# Patient Record
Sex: MALE | Race: BLACK OR AFRICAN AMERICAN | NOT HISPANIC OR LATINO | URBAN - METROPOLITAN AREA
[De-identification: names, ages, dates, MRNs, and addresses within clinical notes are randomized per-mention and may not be internally consistent; named-entity substitution may affect disease eponyms.]

---

## 2018-04-26 ENCOUNTER — EMERGENCY (EMERGENCY)
Facility: HOSPITAL | Age: 58
LOS: 1 days | Discharge: HOME | End: 2018-04-26
Attending: EMERGENCY MEDICINE | Admitting: EMERGENCY MEDICINE
Payer: SELF-PAY

## 2018-04-26 VITALS
SYSTOLIC BLOOD PRESSURE: 127 MMHG | DIASTOLIC BLOOD PRESSURE: 80 MMHG | TEMPERATURE: 99 F | RESPIRATION RATE: 18 BRPM | HEART RATE: 112 BPM | OXYGEN SATURATION: 100 %

## 2018-04-26 VITALS — WEIGHT: 134.92 LBS | HEIGHT: 65 IN

## 2018-04-26 PROCEDURE — 99285 EMERGENCY DEPT VISIT HI MDM: CPT | Mod: 25

## 2018-04-26 PROCEDURE — 26770 TREAT FINGER DISLOCATION: CPT | Mod: 54

## 2018-04-26 PROCEDURE — 73130 X-RAY EXAM OF HAND: CPT | Mod: 26,76,LT

## 2018-04-26 PROCEDURE — 73130 X-RAY EXAM OF HAND: CPT

## 2018-04-26 PROCEDURE — 26770 TREAT FINGER DISLOCATION: CPT | Mod: F4

## 2018-04-26 PROCEDURE — 99284 EMERGENCY DEPT VISIT MOD MDM: CPT | Mod: 25

## 2018-04-26 RX ORDER — IBUPROFEN 200 MG
600 TABLET ORAL ONCE
Qty: 0 | Refills: 0 | Status: COMPLETED | OUTPATIENT
Start: 2018-04-26 | End: 2018-04-26

## 2018-04-26 RX ADMIN — Medication 600 MILLIGRAM(S): at 20:09

## 2018-04-26 RX ADMIN — Medication 600 MILLIGRAM(S): at 20:15

## 2018-04-26 NOTE — ED PROVIDER NOTE - MEDICAL DECISION MAKING DETAILS
X-rays results reviewed X-rays results reviewed, reduction of left 5th digit performed, splinted patient, hand referral, pt explained results and d/c instructions, pt verbalizes understanding results and d/c instructions

## 2018-04-26 NOTE — ED PROVIDER NOTE - PHYSICAL EXAMINATION
General: Well appearing, sitting comfortably Cardio: S1/S2, no murmurs Resp: Clear to auscultation bilaterally, no wheezes, rales or rhonchi MSK: Tenderness over left 5th digit, decreased ROM due to pain, neurovascularly intact, radial pulse 2+ Skin: Deformity of left 5th digit noted, no erythema or swelling noted

## 2018-04-26 NOTE — ED PROVIDER NOTE - ATTENDING CONTRIBUTION TO CARE
SEEN W PA . DISLOCATED FIFTH DIGIT LEFT. DECREASED ROM. NL SENSATION. XRAY SIG DISLOCATION. BLOCKED AND REDUCED BY PA. SPLINTED BY PA. REPEAT FILM IN PLACE. HAND F/U  AGREE WITH HX PE TX DISPO

## 2018-04-26 NOTE — ED PROVIDER NOTE - CARE PLAN
Principal Discharge DX:	Finger dislocation Principal Discharge DX:	Finger dislocation  Secondary Diagnosis:	Finger fracture
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
## 2018-07-03 ENCOUNTER — EMERGENCY (EMERGENCY)
Facility: HOSPITAL | Age: 58
LOS: 1 days | Discharge: HOME | End: 2018-07-03
Attending: EMERGENCY MEDICINE
Payer: COMMERCIAL

## 2018-07-03 VITALS
OXYGEN SATURATION: 98 % | SYSTOLIC BLOOD PRESSURE: 154 MMHG | RESPIRATION RATE: 18 BRPM | DIASTOLIC BLOOD PRESSURE: 90 MMHG | TEMPERATURE: 98 F | HEART RATE: 70 BPM

## 2018-07-03 VITALS
DIASTOLIC BLOOD PRESSURE: 103 MMHG | HEART RATE: 72 BPM | WEIGHT: 139.99 LBS | TEMPERATURE: 98 F | HEIGHT: 65 IN | RESPIRATION RATE: 18 BRPM | OXYGEN SATURATION: 99 % | SYSTOLIC BLOOD PRESSURE: 165 MMHG

## 2018-07-03 PROCEDURE — 99284 EMERGENCY DEPT VISIT MOD MDM: CPT

## 2018-07-03 PROCEDURE — 73130 X-RAY EXAM OF HAND: CPT | Mod: 26,LT

## 2018-07-03 NOTE — ED PROVIDER NOTE - PROGRESS NOTE DETAILS
Pt. with swelling to DIP. Pt. able to flex distal phalanx. Possible dislocation of Left distal phalanx.  Finger splint place after reduction attempt. Pt. most likely with finger sprain after pt. was re-examined. Finger splint still placed on patient. Pt. will be discharged home.
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
## 2018-07-03 NOTE — ED PROVIDER NOTE - PHYSICAL EXAMINATION
General: thin, ill appearing male in NAD resting comfortably on stretcher  HEENT: normocephalic, atraumatic, without lacerations or bruises, no discharge from nasal cavities or ears, nonicteric sclera, neck supple with full ROM   CV: normal S1-S2, no m/r/g  Pulm: clear to auscultation bilaterally without wheeze  Abd: nontender, nondistended, thin, no HSM, no masses   Msk: lumbar tenderness to palpation  Neuro: 5/5 strength bilaterally, sensation intact bilaterally, antalgic gait   Skin: L elbow abrasion with dried blood, wound clean   Psych: no active depression or anxiety

## 2018-07-03 NOTE — ED PROVIDER NOTE - ENMT NEGATIVE STATEMENT, MLM
Ears: no tinnitus. Nose: no nasal drainage..Neck: no lumps, no pain, no stiffness and no swollen glands.
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
## 2018-07-08 PROCEDURE — 73130 X-RAY EXAM OF HAND: CPT

## 2018-07-08 PROCEDURE — 99283 EMERGENCY DEPT VISIT LOW MDM: CPT

## 2020-09-08 ENCOUNTER — EMERGENCY (EMERGENCY)
Facility: HOSPITAL | Age: 60
LOS: 1 days | Discharge: HOME | End: 2020-09-08
Attending: EMERGENCY MEDICINE
Payer: MEDICAID

## 2020-09-08 VITALS
TEMPERATURE: 99 F | SYSTOLIC BLOOD PRESSURE: 182 MMHG | HEART RATE: 82 BPM | RESPIRATION RATE: 20 BRPM | HEIGHT: 65 IN | WEIGHT: 145.06 LBS | OXYGEN SATURATION: 99 % | DIASTOLIC BLOOD PRESSURE: 134 MMHG

## 2020-09-08 VITALS
TEMPERATURE: 99 F | OXYGEN SATURATION: 100 % | DIASTOLIC BLOOD PRESSURE: 110 MMHG | HEART RATE: 73 BPM | SYSTOLIC BLOOD PRESSURE: 164 MMHG | RESPIRATION RATE: 18 BRPM

## 2020-09-08 LAB
ALBUMIN SERPL ELPH-MCNC: 4.2 G/DL — SIGNIFICANT CHANGE UP (ref 3.3–5.2)
ALP SERPL-CCNC: 64 U/L — SIGNIFICANT CHANGE UP (ref 40–120)
ALT FLD-CCNC: 57 U/L — HIGH
ANION GAP SERPL CALC-SCNC: 15 MMOL/L — SIGNIFICANT CHANGE UP (ref 5–17)
AST SERPL-CCNC: 109 U/L — HIGH
BASOPHILS # BLD AUTO: 0.02 K/UL — SIGNIFICANT CHANGE UP (ref 0–0.2)
BASOPHILS NFR BLD AUTO: 0.4 % — SIGNIFICANT CHANGE UP (ref 0–2)
BILIRUB SERPL-MCNC: 0.6 MG/DL — SIGNIFICANT CHANGE UP (ref 0.4–2)
BUN SERPL-MCNC: 11 MG/DL — SIGNIFICANT CHANGE UP (ref 8–20)
CALCIUM SERPL-MCNC: 9.7 MG/DL — SIGNIFICANT CHANGE UP (ref 8.6–10.2)
CHLORIDE SERPL-SCNC: 99 MMOL/L — SIGNIFICANT CHANGE UP (ref 98–107)
CO2 SERPL-SCNC: 23 MMOL/L — SIGNIFICANT CHANGE UP (ref 22–29)
CREAT SERPL-MCNC: 0.97 MG/DL — SIGNIFICANT CHANGE UP (ref 0.5–1.3)
EOSINOPHIL # BLD AUTO: 0.03 K/UL — SIGNIFICANT CHANGE UP (ref 0–0.5)
EOSINOPHIL NFR BLD AUTO: 0.5 % — SIGNIFICANT CHANGE UP (ref 0–6)
GLUCOSE SERPL-MCNC: 95 MG/DL — SIGNIFICANT CHANGE UP (ref 70–99)
HCT VFR BLD CALC: 47 % — SIGNIFICANT CHANGE UP (ref 39–50)
HGB BLD-MCNC: 15.5 G/DL — SIGNIFICANT CHANGE UP (ref 13–17)
IMM GRANULOCYTES NFR BLD AUTO: 0.2 % — SIGNIFICANT CHANGE UP (ref 0–1.5)
LYMPHOCYTES # BLD AUTO: 2.79 K/UL — SIGNIFICANT CHANGE UP (ref 1–3.3)
LYMPHOCYTES # BLD AUTO: 50.8 % — HIGH (ref 13–44)
MAGNESIUM SERPL-MCNC: 2.3 MG/DL — SIGNIFICANT CHANGE UP (ref 1.8–2.6)
MCHC RBC-ENTMCNC: 31.1 PG — SIGNIFICANT CHANGE UP (ref 27–34)
MCHC RBC-ENTMCNC: 33 GM/DL — SIGNIFICANT CHANGE UP (ref 32–36)
MCV RBC AUTO: 94.2 FL — SIGNIFICANT CHANGE UP (ref 80–100)
MONOCYTES # BLD AUTO: 0.83 K/UL — SIGNIFICANT CHANGE UP (ref 0–0.9)
MONOCYTES NFR BLD AUTO: 15.1 % — HIGH (ref 2–14)
NEUTROPHILS # BLD AUTO: 1.81 K/UL — SIGNIFICANT CHANGE UP (ref 1.8–7.4)
NEUTROPHILS NFR BLD AUTO: 33 % — LOW (ref 43–77)
NT-PROBNP SERPL-SCNC: 66 PG/ML — SIGNIFICANT CHANGE UP (ref 0–300)
PLATELET # BLD AUTO: 347 K/UL — SIGNIFICANT CHANGE UP (ref 150–400)
POTASSIUM SERPL-MCNC: 4.8 MMOL/L — SIGNIFICANT CHANGE UP (ref 3.5–5.3)
POTASSIUM SERPL-SCNC: 4.8 MMOL/L — SIGNIFICANT CHANGE UP (ref 3.5–5.3)
PROT SERPL-MCNC: 8 G/DL — SIGNIFICANT CHANGE UP (ref 6.6–8.7)
RBC # BLD: 4.99 M/UL — SIGNIFICANT CHANGE UP (ref 4.2–5.8)
RBC # FLD: 12.2 % — SIGNIFICANT CHANGE UP (ref 10.3–14.5)
SODIUM SERPL-SCNC: 137 MMOL/L — SIGNIFICANT CHANGE UP (ref 135–145)
TROPONIN T SERPL-MCNC: <0.01 NG/ML — SIGNIFICANT CHANGE UP (ref 0–0.06)
TROPONIN T SERPL-MCNC: <0.01 NG/ML — SIGNIFICANT CHANGE UP (ref 0–0.06)
WBC # BLD: 5.49 K/UL — SIGNIFICANT CHANGE UP (ref 3.8–10.5)
WBC # FLD AUTO: 5.49 K/UL — SIGNIFICANT CHANGE UP (ref 3.8–10.5)

## 2020-09-08 PROCEDURE — 36415 COLL VENOUS BLD VENIPUNCTURE: CPT

## 2020-09-08 PROCEDURE — 70450 CT HEAD/BRAIN W/O DYE: CPT | Mod: 26

## 2020-09-08 PROCEDURE — 83880 ASSAY OF NATRIURETIC PEPTIDE: CPT

## 2020-09-08 PROCEDURE — 93010 ELECTROCARDIOGRAM REPORT: CPT

## 2020-09-08 PROCEDURE — 84484 ASSAY OF TROPONIN QUANT: CPT

## 2020-09-08 PROCEDURE — 80053 COMPREHEN METABOLIC PANEL: CPT

## 2020-09-08 PROCEDURE — 83735 ASSAY OF MAGNESIUM: CPT

## 2020-09-08 PROCEDURE — 85025 COMPLETE CBC W/AUTO DIFF WBC: CPT

## 2020-09-08 PROCEDURE — 70450 CT HEAD/BRAIN W/O DYE: CPT

## 2020-09-08 PROCEDURE — 93005 ELECTROCARDIOGRAM TRACING: CPT

## 2020-09-08 PROCEDURE — 71045 X-RAY EXAM CHEST 1 VIEW: CPT

## 2020-09-08 PROCEDURE — 71045 X-RAY EXAM CHEST 1 VIEW: CPT | Mod: 26

## 2020-09-08 PROCEDURE — 99285 EMERGENCY DEPT VISIT HI MDM: CPT

## 2020-09-08 PROCEDURE — 99284 EMERGENCY DEPT VISIT MOD MDM: CPT | Mod: 25

## 2020-09-08 RX ORDER — AMLODIPINE BESYLATE 2.5 MG/1
1 TABLET ORAL
Qty: 15 | Refills: 0
Start: 2020-09-08 | End: 2020-09-22

## 2020-09-08 RX ORDER — AMLODIPINE BESYLATE 2.5 MG/1
5 TABLET ORAL ONCE
Refills: 0 | Status: COMPLETED | OUTPATIENT
Start: 2020-09-08 | End: 2020-09-08

## 2020-09-08 RX ORDER — AMLODIPINE BESYLATE 2.5 MG/1
2.5 TABLET ORAL ONCE
Refills: 0 | Status: COMPLETED | OUTPATIENT
Start: 2020-09-08 | End: 2020-09-08

## 2020-09-08 RX ADMIN — AMLODIPINE BESYLATE 2.5 MILLIGRAM(S): 2.5 TABLET ORAL at 16:23

## 2020-09-08 RX ADMIN — AMLODIPINE BESYLATE 5 MILLIGRAM(S): 2.5 TABLET ORAL at 10:46

## 2020-09-08 NOTE — ED STATDOCS - OBJECTIVE STATEMENT
59 y/o male with PMHx of back pain presents to ED c/o ear pain. Patient reports ringing in his left ear, pulsating and clogged sensation for about 3 weeks. Was using peroxide to try to clear it up, but no relief. Did not see a doctor for this issue, reports he has no PMD.    Denies dizziness, CP, SOB, abdominal pain

## 2020-09-08 NOTE — ED ADULT NURSE NOTE - OBJECTIVE STATEMENT
pt alert and oriented x4 came in c/o left ear pain and hearing humming. pt states he has been pouring water in his ear to help. pt reports not seeing a MD for " multiple years.". pt denies medical hx. RR even unlabored. pt on cardiac monitor. pt educated on plan of care, pt able to successfully teach back plan of care to RN, RN will continue to reeducate pt during hospital stay.

## 2020-09-08 NOTE — ED STATDOCS - ATTENDING CONTRIBUTION TO CARE
I, Wilson Arevalo, performed the initial face to face bedside interview with this patient regarding history of present illness, review of symptoms and relevant past medical, social and family history.  I completed an independent physical examination.  I was the initial provider who evaluated this patient. I have signed out the follow up of any pending tests (i.e. labs, radiological studies) to the ACP.  I have communicated the patient’s plan of care and disposition with the ACP.  The history, relevant review of systems, past medical and surgical history, medical decision making, and physical examination was documented by the scribe in my presence and I attest to the accuracy of the documentation.

## 2020-09-08 NOTE — ED STATDOCS - PROGRESS NOTE DETAILS
Pt moved form intake Room. Pt seen and evaluated by intake Physician. HPI, Physical examination performed by intake Physician . Note reviewed and followup examination performed by me consistent with initial assessment. Agrees with intake Physician plan and tests. Pt trop negative and 2nd set negative. Pt states that he feels fine and wants to leave. Pt treated with Norvasc in ED and D/C in stable condition with Rx sent to his pharmacy.

## 2020-09-08 NOTE — ED STATDOCS - PATIENT PORTAL LINK FT
You can access the FollowMyHealth Patient Portal offered by [facility] by registering at the following website: [email or web address]. By joining [employer]’s FollowMyHealth portal, you will also be able to view your health information using other applications (apps) compatible with our system.

## 2020-09-08 NOTE — ED STATDOCS - CLINICAL SUMMARY MEDICAL DECISION MAKING FREE TEXT BOX
61 y/o male with no PMHX presents with 3 week hx of ringing and pulsating sensation in left ear with one episode of dizziness. Labs, CT, and re-evaluate. Most likely hypertensive urgency.

## 2020-09-08 NOTE — ED STATDOCS - CARE PROVIDER_API CALL
Chacorta Fajardo (MD)  Surgery  [facility], [address] [place] [address]  Phone: [phone]  Fax: [phone]  Established Patient  Follow Up Time:

## 2020-09-08 NOTE — ED STATDOCS - NSFOLLOWUPCLINICS_GEN_ALL_ED_FT
[id number] [place] [address]  Phone: [phone]  Fax:   Follow Up Time: 4-6 Days [address] [place] [address]  Phone: [phone]  Fax:   Follow Up Time: 4-6 Days

## 2021-01-08 ENCOUNTER — APPOINTMENT (OUTPATIENT)
Dept: INTERNAL MEDICINE | Facility: CLINIC | Age: 61
End: 2021-01-08
Payer: MEDICAID

## 2021-01-08 VITALS
TEMPERATURE: 97.3 F | RESPIRATION RATE: 13 BRPM | HEART RATE: 82 BPM | WEIGHT: 134 LBS | DIASTOLIC BLOOD PRESSURE: 85 MMHG | SYSTOLIC BLOOD PRESSURE: 125 MMHG

## 2021-01-08 VITALS — DIASTOLIC BLOOD PRESSURE: 80 MMHG | SYSTOLIC BLOOD PRESSURE: 140 MMHG

## 2021-01-08 DIAGNOSIS — Z81.1 FAMILY HISTORY OF ALCOHOL ABUSE AND DEPENDENCE: ICD-10-CM

## 2021-01-08 DIAGNOSIS — Z00.00 ENCOUNTER FOR GENERAL ADULT MEDICAL EXAMINATION W/OUT ABNORMAL FINDINGS: ICD-10-CM

## 2021-01-08 PROCEDURE — 99386 PREV VISIT NEW AGE 40-64: CPT | Mod: 25

## 2021-01-08 PROCEDURE — G0443: CPT

## 2021-01-08 PROCEDURE — 99072 ADDL SUPL MATRL&STAF TM PHE: CPT

## 2021-01-08 PROCEDURE — G0442 ANNUAL ALCOHOL SCREEN 15 MIN: CPT

## 2021-01-08 PROCEDURE — G0444 DEPRESSION SCREEN ANNUAL: CPT

## 2021-01-08 PROCEDURE — 99406 BEHAV CHNG SMOKING 3-10 MIN: CPT

## 2021-01-08 NOTE — HISTORY OF PRESENT ILLNESS
[FreeTextEntry1] : Annual well visit  [de-identified] : -PMH: HTN, Transaminitis, Alcohol Abuse\par -SH: Single. 4 children. Employed. Current smoker. Smokes marijuana. Drinks 12 beers per day since 1980 coors or budlight. Denies h/o Cocaine or IVDU\par \par QING is a 60 year M whom is here today for CPE and to establish care w/ a new PMD\par Today, pt reports feeling well and is w/o complaints\par He has never followed up w/ a PMD in the past but he did go to the hospital in September 2020 for dizziness and was found to have HTN & Started on Amlodipine 5mg. Labs at that time demonstrated elevated AST>ALT\par He stopped taking Amlodipine 5mg 1mo ago\par BP today 125/85\par He declines STD screening despite recs\par \par Specialists Involved:\par -None\par \par -Alcohol use 12pack of beer per day. Alcohol misuse runs in family. He states he can quit. Has been drinking this way since age 20. \par Denies drug use.

## 2021-01-08 NOTE — COUNSELING
[Risk of tobacco use and health benefits of smoking cessation discussed] : Risk of tobacco use and health benefits of smoking cessation discussed [Cessation strategies including cessation program discussed] : Cessation strategies including cessation program discussed [Use of nicotine replacement therapies and other medications discussed] : Use of nicotine replacement therapies and other medications discussed [Encouraged to pick a quit date and identify support needed to quit] : Encouraged to pick a quit date and identify support needed to quit [Willing to Quit Smoking] : Not willing to quit smoking [Tobacco Use Cessation Intermediate Greater Than 3 Minutes Up to 10 Minutes] : Tobacco Use Cessation Intermediate Greater Than 3 Minutes Up to 10 Minutes [AUDIT-C Screening administered and reviewed] : AUDIT-C Screening administered and reviewed [Hazards of at-risk alcohol use discussed] : Hazards of at-risk alcohol use discussed [Strategies to reduce or eliminate alcohol use discussed] : Strategies to reduce or eliminate alcohol use discussed [Quit Drinking] : Quit Drinking [Participate in Treatment Program] : Participate in treatment program [FreeTextEntry1] : 15

## 2021-01-08 NOTE — HEALTH RISK ASSESSMENT
[Very Good] : ~his/her~  mood as very good [] : Yes [30 or more] : 30 or more [4 or more  times a week (4 pts)] : 4 or more  times a week (4 points) [10 or more (4 pts)] : 10 or more (4  points) [Daily or almost daily (4 pts)] : Daily or almost daily (4 points) [No falls in past year] : Patient reported no falls in the past year [0] : 2) Feeling down, depressed, or hopeless: Not at all (0) [HIV test declined] : HIV test declined [Hepatitis C test declined] : Hepatitis C test declined [None] : None [Alone] : lives alone [Single] : single [# Of Children ___] : has [unfilled] children [Fully functional (bathing, dressing, toileting, transferring, walking, feeding)] : Fully functional (bathing, dressing, toileting, transferring, walking, feeding) [Fully functional (using the telephone, shopping, preparing meals, housekeeping, doing laundry, using] : Fully functional and needs no help or supervision to perform IADLs (using the telephone, shopping, preparing meals, housekeeping, doing laundry, using transportation, managing medications and managing finances) [Reviewed no changes] : Reviewed no changes [Yes] : In the past 12 months have you used drugs other than those required for medical reasons? Yes [Audit-CScore] : 12 [de-identified] : Marijuana [XHF4Prjdb] : 0 [Change in mental status noted] : No change in mental status noted [Reports changes in hearing] : Reports no changes in hearing [Reports changes in vision] : Reports no changes in vision [ColonoscopyComments] : Referral to GI given today [AdvancecareDate] : 01/20

## 2021-01-08 NOTE — ASSESSMENT
[FreeTextEntry1] : -PMH: HTN, Transaminitis, Alcohol Abuse\par -SH: Single. 4 children. Employed. Current smoker. Smokes marijuana. Drinks 12 beers per day since 1980 coors or budlight. Denies h/o Cocaine or IVDU\par \par QING is a 60 year M whom is here today for CPE and to establish care w/ a new PMD\par \par Specialists Involved:\par -None\par \par Declines Labs & EKG \par -EtOH Cessation \par -GI Consult (Colonoscopy)\par -RTO 3mo for EKG, Labs, BP Check

## 2021-01-11 ENCOUNTER — EMERGENCY (EMERGENCY)
Facility: HOSPITAL | Age: 61
LOS: 1 days | Discharge: HOME | End: 2021-01-11
Attending: EMERGENCY MEDICINE
Payer: COMMERCIAL

## 2021-01-11 VITALS
HEIGHT: 65 IN | WEIGHT: 139.99 LBS | HEART RATE: 74 BPM | TEMPERATURE: 99 F | OXYGEN SATURATION: 99 % | SYSTOLIC BLOOD PRESSURE: 171 MMHG | RESPIRATION RATE: 18 BRPM | DIASTOLIC BLOOD PRESSURE: 103 MMHG

## 2021-01-11 PROCEDURE — 93005 ELECTROCARDIOGRAM TRACING: CPT

## 2021-01-11 PROCEDURE — 71250 CT THORAX DX C-: CPT

## 2021-01-11 PROCEDURE — 99284 EMERGENCY DEPT VISIT MOD MDM: CPT

## 2021-01-11 PROCEDURE — 93010 ELECTROCARDIOGRAM REPORT: CPT

## 2021-01-11 PROCEDURE — 71250 CT THORAX DX C-: CPT | Mod: 26

## 2021-01-11 RX ORDER — CYCLOBENZAPRINE HYDROCHLORIDE 10 MG/1
1 TABLET, FILM COATED ORAL
Qty: 9 | Refills: 0
Start: 2021-01-11 | End: 2021-01-13

## 2021-01-11 RX ORDER — ALBUTEROL 90 UG/1
2 AEROSOL, METERED ORAL
Qty: 1 | Refills: 0
Start: 2021-01-11 | End: 2021-01-17

## 2021-01-11 RX ORDER — DIAZEPAM 5 MG
5 TABLET ORAL ONCE
Refills: 0 | Status: DISCONTINUED | OUTPATIENT
Start: 2021-01-11 | End: 2021-01-11

## 2021-01-11 RX ORDER — IBUPROFEN 200 MG
600 TABLET ORAL ONCE
Refills: 0 | Status: COMPLETED | OUTPATIENT
Start: 2021-01-11 | End: 2021-01-11

## 2021-01-11 RX ORDER — ACETAMINOPHEN 500 MG
650 TABLET ORAL ONCE
Refills: 0 | Status: COMPLETED | OUTPATIENT
Start: 2021-01-11 | End: 2021-01-11

## 2021-01-11 RX ADMIN — Medication 5 MILLIGRAM(S): at 10:42

## 2021-01-11 RX ADMIN — Medication 600 MILLIGRAM(S): at 10:42

## 2021-01-11 RX ADMIN — Medication 650 MILLIGRAM(S): at 10:42

## 2021-01-11 NOTE — ED STATDOCS - PROGRESS NOTE DETAILS
PA NOTE: No emergent findings on imaging. Pain reproducible overlying intercostal spaces. Will treat with course of PO flexeril. Pt given strict return precautions and verbalized understanding.

## 2021-01-11 NOTE — ED ADULT TRIAGE NOTE - CHIEF COMPLAINT QUOTE
patient was carrying a cast iron bathtub last week and after patient experiencing left rib pain and shortness of breath

## 2021-01-11 NOTE — ED STATDOCS - CLINICAL SUMMARY MEDICAL DECISION MAKING FREE TEXT BOX
Rib fracture vs pneumothorax. Will give Valium, Tylenol and Motrin and obtain CT chest. Follow up with bp.

## 2021-01-11 NOTE — ED STATDOCS - CARDIAC, MLM
normal rate, regular rhythm, and no murmur. Some left JVD normal rate, regular rhythm, and no murmur. Some left JVD , probably baseline for him

## 2021-01-11 NOTE — ED STATDOCS - PATIENT PORTAL LINK FT
You can access the FollowMyHealth Patient Portal offered by [facility] by registering at the following website: [email or web address]. By joining [employer]’s FollowMyHealth portal, you will also be able to view your health information using other applications (apps) compatible with our system.

## 2021-01-11 NOTE — ED STATDOCS - OBJECTIVE STATEMENT
61 y/o M with PMHx of HTN on amlodipine presents to ED c/o left sided rib pain that is worse with movement. Associated symptoms of sob. 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 Pt states his HTN medication was making him urinate to frequently so he stopped taking it. Pt went to PMD last Friday and she advised him to take half the doses and she will drop it from 5mg to 2.5 mg.

## 2021-04-21 ENCOUNTER — APPOINTMENT (OUTPATIENT)
Dept: INTERNAL MEDICINE | Facility: CLINIC | Age: 61
End: 2021-04-21

## 2021-08-10 ENCOUNTER — NON-APPOINTMENT (OUTPATIENT)
Age: 61
End: 2021-08-10

## 2021-08-10 RX ORDER — AMLODIPINE BESYLATE 2.5 MG/1
2.5 TABLET ORAL
Qty: 30 | Refills: 0 | Status: ACTIVE | COMMUNITY
Start: 2021-01-08

## 2021-08-25 ENCOUNTER — APPOINTMENT (OUTPATIENT)
Dept: INTERNAL MEDICINE | Facility: CLINIC | Age: 61
End: 2021-08-25

## 2021-08-25 ENCOUNTER — NON-APPOINTMENT (OUTPATIENT)
Age: 61
End: 2021-08-25

## 2021-08-25 DIAGNOSIS — F10.10 ALCOHOL ABUSE, UNCOMPLICATED: ICD-10-CM

## 2021-08-25 DIAGNOSIS — F17.200 NICOTINE DEPENDENCE, UNSPECIFIED, UNCOMPLICATED: ICD-10-CM

## 2021-08-25 DIAGNOSIS — R74.01 ELEVATION OF LEVELS OF LIVER TRANSAMINASE LEVELS: ICD-10-CM

## 2021-08-25 DIAGNOSIS — I10 ESSENTIAL (PRIMARY) HYPERTENSION: ICD-10-CM

## 2021-08-25 NOTE — HISTORY OF PRESENT ILLNESS
[FreeTextEntry1] : f/u HTN, EKG & labs [de-identified] : -PMH: HTN, Transaminitis, Alcohol Abuse\par -SH: Single. 4 children. Employed. Current smoker. Smokes marijuana. Drinks 12 beers per day since 1980 coors or budlight. Denies h/o Cocaine or IVDU\par \par QING is a 60 year M whom is here today for f/u HTN, EKG & labs\par Today, pt reports feeling well and is w/o complaints\par He denies any changes since our last visit\par \par -HTN: Remains on Amlodipine 2.5mg QD\par -Alcohol use 12pack of beer per day. Alcohol misuse runs in family. He states he can quit. Has been drinking this way since age 20. \par Denies drug use.

## 2021-08-25 NOTE — ASSESSMENT
[FreeTextEntry1] : -PMH: HTN, Transaminitis, Alcohol Abuse\par -SH: Single. 4 children. Employed. Current smoker. Smokes marijuana. Drinks 12 beers per day since 1980 coors or budlight. Denies h/o Cocaine or IVDU\par \par QING is a 60 year M whom is here today for f/u HTN, EKG & labs\par \par Specialists Involved:\par -None\par \par EKG obtained in office today demonstrates NSR. normal axis/Intervals. Good-R-wave progression. Normal EKG. \par \par -F/u labs drawn in office today\par -Further recs pending lab results\par -GI Consult (Colonoscopy)\par -RTO 6mo for CPE or sooner if needed

## 2021-10-06 PROBLEM — I10 ESSENTIAL HYPERTENSION: Status: ACTIVE | Noted: 2021-01-08

## 2021-12-19 ENCOUNTER — EMERGENCY (EMERGENCY)
Facility: HOSPITAL | Age: 61
LOS: 1 days | Discharge: HOME | End: 2021-12-19
Attending: EMERGENCY MEDICINE
Payer: COMMERCIAL

## 2021-12-19 VITALS
DIASTOLIC BLOOD PRESSURE: 94 MMHG | OXYGEN SATURATION: 100 % | RESPIRATION RATE: 16 BRPM | HEIGHT: 65 IN | HEART RATE: 70 BPM | WEIGHT: 160.06 LBS | TEMPERATURE: 98 F | SYSTOLIC BLOOD PRESSURE: 166 MMHG

## 2021-12-19 PROCEDURE — 99283 EMERGENCY DEPT VISIT LOW MDM: CPT

## 2021-12-19 PROCEDURE — 99284 EMERGENCY DEPT VISIT MOD MDM: CPT

## 2021-12-19 RX ORDER — DIAZEPAM 5 MG
5 TABLET ORAL ONCE
Refills: 0 | Status: DISCONTINUED | OUTPATIENT
Start: 2021-12-19 | End: 2021-12-19

## 2021-12-19 RX ORDER — LIDOCAINE 4 G/100G
1 CREAM TOPICAL ONCE
Refills: 0 | Status: COMPLETED | OUTPATIENT
Start: 2021-12-19 | End: 2021-12-19

## 2021-12-19 RX ORDER — KETOROLAC TROMETHAMINE 30 MG/ML
30 SYRINGE (ML) INJECTION ONCE
Refills: 0 | Status: DISCONTINUED | OUTPATIENT
Start: 2021-12-19 | End: 2021-12-19

## 2021-12-19 RX ADMIN — LIDOCAINE 1 PATCH: 4 CREAM TOPICAL at 15:36

## 2021-12-19 RX ADMIN — Medication 5 MILLIGRAM(S): at 16:40

## 2021-12-19 RX ADMIN — Medication 30 MILLIGRAM(S): at 15:38

## 2021-12-19 NOTE — ED PROVIDER NOTE - NSFOLLOWUPCLINICS_GEN_ALL_ED_FT
[facility] Spine - [facility]  Ortho/Spine  [address] [place] [address]  Phone: [phone]  Fax:

## 2021-12-19 NOTE — ED PROVIDER NOTE - OBJECTIVE STATEMENT
Pt is a 61y M with no PMH presenting for lower back pain. Pt states he was lifting something yesterday when the pain started. Pt states he has hx of back pain and it feels the same. Pt did not take any medication for the pain. Pt states he does not want pills states the only thing that works for the pain is an injection. He denies any bowel or bladder incontinence/paresthesias/ weakness/fever/chills.  Pt is able to ambulate. NKDA.

## 2021-12-19 NOTE — ED PROVIDER NOTE - CLINICAL SUMMARY MEDICAL DECISION MAKING FREE TEXT BOX
Pt is a 61y M with no PMH presenting for lower back pain after lifting yesterday. Will medicate and have f/u with spine.

## 2021-12-19 NOTE — ED PROVIDER NOTE - NSFOLLOWUPINSTRUCTIONS_ED_ALL_ED_FT
Follow up with spine center.  Come back with new or worsening symptoms.  Back Pain    Back pain is very common in adults. The cause of back pain is rarely dangerous and the pain often gets better over time. The cause of your back pain may not be known and may include strain of muscles or ligaments, degeneration of the spinal disks, or arthritis. Occasionally the pain may radiate down your leg(s). Over-the-counter medicines to reduce pain and inflammation are often the most helpful. Stretching and remaining active frequently helps the healing process.     SEEK IMMEDIATE MEDICAL CARE IF YOU HAVE ANY OF THE FOLLOWING SYMPTOMS: bowel or bladder control problems, unusual weakness or numbness in your arms or legs, nausea or vomiting, abdominal pain, fever, dizziness/lightheadedness.

## 2021-12-19 NOTE — ED PROVIDER NOTE - ATTENDING CONTRIBUTION TO CARE
Pt. awake and alert. Pt. with mild tenderness to lumbar region. I, Dr. Escobar, performed a face to face bedside interview with this patient regarding history of present illness, review of symptoms and relevant past medical, social and family history.  I completed an independent physical examination.  I have also reviewed the ACP's note(s) and discussed the plan with the ACP.

## 2021-12-19 NOTE — ED PROVIDER NOTE - PATIENT PORTAL LINK FT
You can access the FollowMyHealth Patient Portal offered by [facility] by registering at the following website: [email or web address]. By joining [email or web address]’s FollowMyHealth portal, you will also be able to view your health information using other applications (apps) compatible with our system.

## 2021-12-21 ENCOUNTER — EMERGENCY (EMERGENCY)
Facility: HOSPITAL | Age: 61
LOS: 1 days | Discharge: HOME | End: 2021-12-21
Attending: STUDENT IN AN ORGANIZED HEALTH CARE EDUCATION/TRAINING PROGRAM
Payer: COMMERCIAL

## 2021-12-21 VITALS
SYSTOLIC BLOOD PRESSURE: 150 MMHG | HEART RATE: 92 BPM | WEIGHT: 139.99 LBS | TEMPERATURE: 99 F | OXYGEN SATURATION: 100 % | HEIGHT: 65 IN | DIASTOLIC BLOOD PRESSURE: 97 MMHG | RESPIRATION RATE: 20 BRPM

## 2021-12-21 PROCEDURE — 99283 EMERGENCY DEPT VISIT LOW MDM: CPT | Mod: 25

## 2021-12-21 PROCEDURE — 96372 THER/PROPH/DIAG INJ SC/IM: CPT

## 2021-12-21 PROCEDURE — 99284 EMERGENCY DEPT VISIT MOD MDM: CPT

## 2021-12-21 RX ORDER — LIDOCAINE 4 G/100G
1 CREAM TOPICAL ONCE
Refills: 0 | Status: COMPLETED | OUTPATIENT
Start: 2021-12-21 | End: 2021-12-21

## 2021-12-21 RX ORDER — DIAZEPAM 5 MG
1 TABLET ORAL
Qty: 10 | Refills: 0
Start: 2021-12-21 | End: 2021-12-25

## 2021-12-21 RX ORDER — KETOROLAC TROMETHAMINE 30 MG/ML
60 SYRINGE (ML) INJECTION ONCE
Refills: 0 | Status: DISCONTINUED | OUTPATIENT
Start: 2021-12-21 | End: 2021-12-21

## 2021-12-21 RX ADMIN — LIDOCAINE 1 PATCH: 4 CREAM TOPICAL at 08:15

## 2021-12-21 RX ADMIN — Medication 60 MILLIGRAM(S): at 08:15

## 2021-12-21 NOTE — ED PROVIDER NOTE - PATIENT PORTAL LINK FT
You can access the FollowMyHealth Patient Portal offered by [facility] by registering at the following website: [email or web address]. By joining [employer]’s FollowMyHealth portal, you will also be able to view your health information using other applications (apps) compatible with our system.

## 2021-12-21 NOTE — ED PROVIDER NOTE - ATTENDING CONTRIBUTION TO CARE
62yo male with pmh lower back pain. Pt states 2 days ago he lifted a heavy object, and since then with pain, came here when this happened has some relief of pain but returned. Pt didn't take anything for pain today. Pt denies fevers/chills, ha, loc, focal neuro deficits, cp/sob/palp, cough, abd pain/n/v/d, urinary symptoms, recent travel and sick contacts.  Const: Awake, alert and oriented. In no acute distress. Well appearing.  HEENT: NC/AT. Moist mucous membranes.  Eyes: No scleral icterus. EOMI.  Neck:. Soft and supple. Full ROM without pain.  Cardiac: Regular rate and regular rhythm. +S1/S2. Peripheral pulses 2+ and symmetric. No LE edema.  Resp: Speaking in full sentences. No evidence of respiratory distress. No wheezes, rales or rhonchi.  Abd: Soft, non-tender, non-distended. Normal bowel sounds in all 4 quadrants. No guarding or rebound.  Back: Spine midline and non-tender. No CVAT. +paralumbar ttp  Skin: No rashes, abrasions or lacerations.  Lymph: No cervical lymphadenopathy.  Neuro: A&Ox3, moving all extremities symmetrically, follows commands, motor kelton upper and lower ext 5/5, sensory symm and intact CN 2-12 grossly intact, no ataxia, no nystagmus, no dysmetria, no ddk, symm kelton, no pronator drift, normal patellar reflexes  Pt denies bladder/bowel incontinence. saddle anesthesia, neuro deficits. No midline spine tenderness, no step offs. + Paraspinal ttp. Likely msk pain, analgesia, reassess

## 2021-12-21 NOTE — ED PROVIDER NOTE - OBJECTIVE STATEMENT
Pt is a 62 y/o M w/pMHX lower back pain.  Pt states he has chronic lower back pain, has not followed up with spine, was lifting some groceries the other day and strained his back.  He states the pain is his typical pain, and came to the ED two days ago for treatment, but states the medication did not last.  Pt denies numbness, tingling, saddle anesthesia, urinary retention/ incontinence, no fevers.

## 2021-12-21 NOTE — ED PROVIDER NOTE - CLINICAL SUMMARY MEDICAL DECISION MAKING FREE TEXT BOX
Pt is a 60 y/o M c/o back pain, no red flags on exam, will treat symptomatically, send Rx, advise ortho f/u

## 2021-12-21 NOTE — ED PROVIDER NOTE - CARE PROVIDER_API CALL
José Miguel Carmen (DO)  Orthopaedic Surgery  [address] [place] [address], [address]  [place] [address]  Phone: [phone]  Fax: [phone]  Follow Up Time:

## 2021-12-21 NOTE — ED ADULT TRIAGE NOTE - CHIEF COMPLAINT QUOTE
Pt arrives to ED c/o  lower back after lifting 30 pound ham . Pt was seen here on Sunday for the same

## 2022-08-03 ENCOUNTER — EMERGENCY (EMERGENCY)
Facility: HOSPITAL | Age: 62
LOS: 1 days | Discharge: HOME | End: 2022-08-03
Attending: EMERGENCY MEDICINE
Payer: COMMERCIAL

## 2022-08-03 VITALS
SYSTOLIC BLOOD PRESSURE: 146 MMHG | HEART RATE: 74 BPM | DIASTOLIC BLOOD PRESSURE: 86 MMHG | RESPIRATION RATE: 22 BRPM | TEMPERATURE: 98 F | HEIGHT: 65 IN | OXYGEN SATURATION: 98 % | WEIGHT: 134.92 LBS

## 2022-08-03 VITALS — RESPIRATION RATE: 18 BRPM

## 2022-08-03 PROCEDURE — 96372 THER/PROPH/DIAG INJ SC/IM: CPT

## 2022-08-03 PROCEDURE — 99283 EMERGENCY DEPT VISIT LOW MDM: CPT

## 2022-08-03 PROCEDURE — 99284 EMERGENCY DEPT VISIT MOD MDM: CPT

## 2022-08-03 RX ORDER — KETOROLAC TROMETHAMINE 30 MG/ML
30 SYRINGE (ML) INJECTION ONCE
Refills: 0 | Status: DISCONTINUED | OUTPATIENT
Start: 2022-08-03 | End: 2022-08-03

## 2022-08-03 RX ORDER — LIDOCAINE 4 G/100G
1 CREAM TOPICAL ONCE
Refills: 0 | Status: COMPLETED | OUTPATIENT
Start: 2022-08-03 | End: 2022-08-03

## 2022-08-03 RX ORDER — TRAMADOL HYDROCHLORIDE 50 MG/1
1 TABLET ORAL
Qty: 20 | Refills: 0
Start: 2022-08-03

## 2022-08-03 RX ADMIN — Medication 30 MILLIGRAM(S): at 11:12

## 2022-08-03 RX ADMIN — LIDOCAINE 1 PATCH: 4 CREAM TOPICAL at 11:11

## 2022-08-03 NOTE — ED PROVIDER NOTE - ATTENDING APP SHARED VISIT CONTRIBUTION OF CARE
chronic, intermittent low back pain.  reports increased pain over the past month: low back radiating around to abd without weakness or numbness of extremities.  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  Has never seen a specialist.  Take smotirn or tylenol without relief.  PE:  nad, nonlocalized midline ttp L2-5, motor exam 5/5 and equal kelton, nromal gait.  A/P acute on chronic back pain:  tramadol and ortho spine referral.
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
## 2022-08-03 NOTE — ED PROVIDER NOTE - PATIENT PORTAL LINK FT
You can access the FollowMyHealth Patient Portal offered by [facility] by registering at the following website: [email or web address]. By joining [employer]’s FollowMyHealth portal, you will also be able to view your health information using other applications (apps) compatible with our system.

## 2022-08-03 NOTE — ED PROVIDER NOTE - NSFOLLOWUPCLINICS_GEN_ALL_ED_FT
[facility]  [address] [place] [address]  Phone: [phone]  Fax:     [facility] - Primary Care  Primary Care  [address] [place] [address]  Phone: [phone]  Fax:

## 2022-08-03 NOTE — ED PROVIDER NOTE - NS ED ATTENDING STATEMENT MOD
This was a shared visit with the ELVIS. I reviewed and verified the documentation and independently performed the documented:

## 2022-08-03 NOTE — ED PROVIDER NOTE - CARE PROVIDER_API CALL
Solomon Bonilla)  Neurosurgery  [address]  [place] [address]  Phone: [phone]  Fax: [phone]  Follow Up Time:

## 2022-08-03 NOTE — ED PROVIDER NOTE - NSFOLLOWUPINSTRUCTIONS_ED_ALL_ED_FT
Back Pain    Back pain is very common in adults. The cause of back pain is rarely dangerous and the pain often gets better over time. The cause of your back pain may not be known and may include strain of muscles or ligaments, degeneration of the spinal disks, or arthritis. Occasionally the pain may radiate down your leg(s). Over-the-counter medicines to reduce pain and inflammation are often the most helpful. Stretching and remaining active frequently helps the healing process.     SEEK IMMEDIATE MEDICAL CARE IF YOU HAVE ANY OF THE FOLLOWING SYMPTOMS: bowel or bladder control problems, unusual weakness or numbness in your arms or legs, nausea or vomiting, abdominal pain, fever, dizziness/lightheadedness.    Follow up with orthopedics.

## 2023-04-26 ENCOUNTER — OFFICE (OUTPATIENT)
Dept: URBAN - METROPOLITAN AREA CLINIC 116 | Facility: CLINIC | Age: 63
Setting detail: OPHTHALMOLOGY
End: 2023-04-26
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
## 2023-04-26 PROCEDURE — NO SHOW FE NO SHOW FEE: Performed by: OPTOMETRIST

## 2023-07-10 ENCOUNTER — EMERGENCY (EMERGENCY)
Facility: HOSPITAL | Age: 63
LOS: 1 days | Discharge: HOME | End: 2023-07-10
Attending: EMERGENCY MEDICINE
Payer: COMMERCIAL

## 2023-07-10 VITALS
WEIGHT: 139.99 LBS | RESPIRATION RATE: 18 BRPM | SYSTOLIC BLOOD PRESSURE: 162 MMHG | DIASTOLIC BLOOD PRESSURE: 97 MMHG | HEART RATE: 75 BPM | TEMPERATURE: 98 F | OXYGEN SATURATION: 99 % | HEIGHT: 66 IN

## 2023-07-10 PROCEDURE — 99282 EMERGENCY DEPT VISIT SF MDM: CPT

## 2023-07-10 PROCEDURE — 99284 EMERGENCY DEPT VISIT MOD MDM: CPT

## 2023-07-10 RX ORDER — SODIUM CHLORIDE 9 MG/ML
1000 INJECTION INTRAMUSCULAR; INTRAVENOUS; SUBCUTANEOUS ONCE
Refills: 0 | Status: DISCONTINUED | OUTPATIENT
Start: 2023-07-10 | End: 2023-07-17

## 2023-07-10 NOTE — ED ADULT TRIAGE NOTE - CHIEF COMPLAINT QUOTE
pt states he has HTN, BP was 140/106  A&Ox3, resp wnl, want to be evaluated, denies any weakness or dizziness or vision changes

## 2023-07-10 NOTE — ED PROVIDER NOTE - PHYSICAL EXAMINATION
Gen: No acute distress, non toxic  HEENT: Mucous membranes moist, pink conjunctivae, EOMI  CV: RRR, nl s1/s2.  Resp: CTAB, normal rate and effort  GI: Abdomen soft, NT, ND. No rebound, no guarding  : No CVAT  Neuro: A&O x 3, moving all 4 extremities. cn 2-12 wnl. nl motor sensation.   MSK: No spine or joint tenderness to palpation  Skin: No rashes. intact and perfused.

## 2023-07-10 NOTE — ED ADULT NURSE NOTE - OBJECTIVE STATEMENT
Pt a/ox3 c/o dizziness that started Friday. Pt stated he has HTN and takes amlodipine. Pt states he tends to feel dizziness in the heat. Pt denies weakness, feeling like the room is spinning, sob, visual changes, or headache. Pt denies any pain. Pt is refusing IV insertion and refusing labs to be drawn stating he already had hi blood drawn and is waiting for results tomorrow. M.D. was notified.

## 2023-07-10 NOTE — ED PROVIDER NOTE - PATIENT PORTAL LINK FT
You can access the FollowMyHealth Patient Portal offered by [facility] by registering at the following website: [email or web address]. By joining [employer]’s FollowMyHealth portal, you will also be able to view your health information using other applications (apps) compatible with our system.

## 2023-07-10 NOTE — ED ADULT NURSE NOTE - NSFALLUNIVINTERV_ED_ALL_ED
Bed/Stretcher in lowest position, wheels locked, appropriate side rails in place/Call bell, personal items and telephone in reach/Instruct patient to call for assistance before getting out of bed/chair/stretcher/Non-slip footwear applied when patient is off stretcher/[place] to call system/Physically safe environment - no spills, clutter or unnecessary equipment/Purposeful proactive rounding/Room/bathroom lighting operational, light cord in reach

## 2023-07-10 NOTE — ED PROVIDER NOTE - CLINICAL SUMMARY MEDICAL DECISION MAKING FREE TEXT BOX
62 y/o male hx htn with frequent etoh c/o eval for htn. states sometimes lightheaded, thinks could be dehydrated. no other focal symptosm, neuro intact, not intoxicated no withdrawal. will check basic labs, hydate, advise bp log. return precautions and pcp f/u. 64 y/o male hx htn with frequent etoh c/o eval for htn. states sometimes lightheaded, thinks could be dehydrated. no other focal symptosm, neuro intact, not intoxicated no withdrawal. will check basic labs, hydate, advise bp log. return precautions and pcp f/u.    pt states he doesn't want any labs or fluids/further work up. well appearing. advised bp log. f/u close pcp. return precautions.

## 2023-07-10 NOTE — ED PROVIDER NOTE - OBJECTIVE STATEMENT
62 y/o male hx htn c/o htn at home over past 2 dasy. states sometimes with very mild lightheadedness. thinks he could be a little dehydrated. no pain, no sob, no f/c, no neuro symtpoms, no headache. no other complaints. takes 5mg amlodipine. bp's ~140-160 at home. no other complaints.

## 2023-07-18 NOTE — ED ADULT TRIAGE NOTE - RESPIRATORY RATE (BREATHS/MIN)
[facility] GERIATRICS  INITIAL VISIT NOTE  July 18, 2023    PRIMARY CARE PROVIDER AND CLINIC:  Mara Murillo [address] [facility] [address]    [facility] Medical Record Number:  [id number]  Place of Service where encounter took place:  [facility] ([facility]) [[id number]]    Chief Complaint   Patient presents with     Hospital F/U       HPI:    Julisa Chaney is a 78 year old  (1945) female [facility]. Medical history is notable for trigeminal neuralgia, chronic pain, seizure disorder. She was hospitalized at [facility] from 7/1/23 to 7/6/23 where she presented with generalized weakness and a cough and was admitted with a RLL PNA and sepsis. She was discharged to complete a course of antibiotics.  SLP eval with concern for upper esophageal stasis and possible dysmotility.  GI was consulted and recommended an outpatient EGD once pneumonia has resolved and continuing a PPI. She was also started on tamsulosin - not really any details in the chart about this - I think it was because she was straight cath'd for 600 ml in the ER. She admitted to this facility for  rehab, medical management and nursing care.      History obtained from: facility chart records, facility staff, patient report and [facility] chart review.      Today, Ms Chaney is seen in her room sitting up in bed reading a novel.  She is an excellent historian. She told me about the 3 falls she had at home prior to her hospital stay, with the last one where she was unable to get up.  She is able to tell me about her hospital stay and is aware of the purpose of the TCU.  Her goal remains restorative and to get back home.  No chest pain or dyspnea.  No belly pain.  No diarrhea or constipation.  She is sleeping well.  She likes the food and is eating well.  No concerns today per discussion with nursing.  She is working with therapies.    CODE STATUS: CPR/Full code     ALLERGIES:  Allergies   Allergen  Reactions     Contrast [Iohexol] Rash     Noticed during 08/2020 admission. Received IV contrast on 8/5     Chocolate Headache     Contrast Dye      Penicillins Rash       PAST MEDICAL HISTORY:   Past Medical History:   Diagnosis Date     Aspiration pneumonia (H) 02/2022     Depression      High cholesterol      Migraines      Pyloric ulcer, chronic      Sepsis (H) 08/10/2020     Trigeminal neuralgia        PAST SURGICAL HISTORY:   Past Surgical History:   Procedure Laterality Date     HYSTERECTOMY       IR GASTROSTOMY TUBE PERCUTANEOUS PLCMNT  8/16/2022     PICC TRIPLE LUMEN PLACEMENT  7/22/2022          FL ESOPHAGOGASTRODUODENOSCOPY TRANSORAL DIAGNOSTIC N/A 8/13/2020    Procedure: ESOPHAGOGASTRODUODENOSCOPY (EGD);  Surgeon: Nathan Smalls MD;  Location: [facility];  Service: Gastroenterology     FL ESOPHAGOGASTRODUODENOSCOPY TRANSORAL DIAGNOSTIC N/A 8/14/2020    Procedure: ESOPHAGOGASTRODUODENOSCOPY (EGD), PYLORIC DILATION;  Surgeon: Nathan Smalls MD;  Location: [facility];  Service: Gastroenterology     VENTRICULOSTOMY Left 7/31/2022    Procedure: LEFT FRONTAL VENTRICULOSTOMY;  Surgeon: Brady Heredia MD;  Location: [facility] COLONOSCOPY W/WO BRUSH/WASH N/A 8/13/2020    Procedure: COLONOSCOPY WITH POLYPECTOMY;  Surgeon: Nathan Smalls MD;  Location: [facility];  Service: Gastroenterology       FAMILY HISTORY:   Family History   Problem Relation Age of Onset     Cancer Father      Testicular cancer Grandchild 17.00     Breast Cancer Mother      Breast Cancer Sister      Breast Cancer Maternal Aunt      Breast Cancer Sister      SOCIAL HISTORY:   Patient's living condition: lives alone    MEDICATIONS:  Post Discharge Medication Reconciliation Status: discharge medications reconciled and changed, per note/orders.  Current Outpatient Medications   Medication Sig Dispense Refill     acetaminophen-codeine (TYLENOL #3) 300-30 MG per tablet Take 1 tablet by mouth every 6 hours as  "needed for severe pain 60 tablet 0     desonide (DESOWEN) 0.05 % cream [DESONIDE (DESOWEN) 0.05 % CREAM] Apply to affected area 2 times daily (Patient taking differently: Apply topically 2 times daily as needed (to sores as needed)) 60 g 1     ferrous sulfate (FEROSUL) 325 (65 Fe) MG tablet Take 1 tablet (325 mg) by mouth daily (with breakfast) 90 tablet 3     gabapentin (NEURONTIN) 300 MG capsule Take 1 capsule (300 mg) by mouth 2 times daily for 30 days 60 capsule 0     levETIRAcetam (KEPPRA) 750 MG tablet TAKE 1 TABLET(750 MG) BY MOUTH TWICE DAILY 180 tablet 1     meclizine (ANTIVERT) 12.5 MG tablet Take 1 tablet (12.5 mg) by mouth 2 times daily 30 tablet 0     mirtazapine (REMERON) 15 MG tablet TAKE 1 TABLET(15 MG) BY MOUTH AT BEDTIME 30 tablet 2     Multiple Vitamin (MULTIVITAMIN) TABS Take 1 tablet by mouth daily 100 tablet 3     omeprazole (PRILOSEC) 40 MG DR capsule Take 1 capsule (40 mg) by mouth daily 90 capsule 3     OXcarbazepine (TRILEPTAL) 150 MG tablet TAKE 3 TABLETS(450 MG) BY MOUTH AT BEDTIME 270 tablet 2     polyvinyl alcohol (ARTIFICIAL TEARS) 1.4 % ophthalmic solution Place 1 drop into both eyes every hour as needed for dry eyes 60 mL 0     QUEtiapine (SEROQUEL) 50 MG tablet Take 1 tablet (50 mg) by mouth At Bedtime 90 tablet 1     senna-docusate (STIMULANT LAXATIVE) 8.6-50 MG tablet TAKE 1 TABLET BY MOUTH AT BEDTIME 90 tablet 3     tamsulosin (FLOMAX) 0.4 MG capsule Take 1 capsule (0.4 mg) by mouth daily for 30 days 30 capsule 0     topiramate (TOPAMAX) 50 MG tablet TAKE 1 TABLET(50 MG) BY MOUTH AT BEDTIME 90 tablet 3     Vitamin D3 50 mcg (2000 units) tablet Take 1 tablet (50 mcg) by mouth daily 90 tablet 4       ROS:  6 point ROS neg other than the symptoms noted above in the HPI.      PHYSICAL EXAM:  /62   Pulse 68   Temp 96.9  F (36.1  C)   Resp 18   Ht 1.6 m (5' 3\")   Wt 48.7 kg (107 lb 6.4 oz)   SpO2 93%   BMI 19.03 kg/m    Gen: sitting up in bed, alert, cooperative and in no " acute distress   Card: RRR, S1, S2, no murmurs  Resp: lungs clear to auscultation bilaterally, no crackles or wheezes anteriorly or latearlly   Ext: no LE edema  Neuro: CX II-XII grossly in tact; ROM in all four extremities grossly in tact  Psych: alert and oriented x3; normal affect     LABORATORY/IMAGING DATA:  Reviewed as per [facility] and/or [facility]    ASSESSMENT/PLAN:    RLL PNA with Sepsis, Resolved  Afebrile. No hypoxia. Lungs clear today. Completed antibiotics  -- follow clinically    Esophageal Dysmotility  GI consulted after SLP eval  -- needs outpatient EGD - will defer to PCP    Urinary Retention   ER nursing note with straight cath for 600 ml and appears was started on tamsulosin at that time. Not really otherwise in chart  -- tamsulosin 0.4 mg daily  -- not sure this is needed? Will need to watch BPs, etc given her history of falls    Seizure Disorder  -- levetiracetam 750 mg BID     Trigeminal Neuralgia  Chronic Pain Syndrome  -- gabapentin 300 mg BID, oxcarbazepine 450 mg at bedtime and topiramate 50 mg at bedtime     Mild Major Recurrent Depression  Mood and spirits were good today  -- mirtazapine 15 mg at bedtime, quetiapine 50 mg at bedtime     Fe Deficiency Anemia  Baseline Hgb 11 range. Hgb 13.2 on 7/10.   -- FeSO4 325 mg daily     GERD  -- omeprazole 20 mg daily     Physical Deconditioning  In setting of hospitalization and underlying medical conditions  -- ongoing PT/OT      Electronically signed by:  Clementine Berumen MD                       18

## 2023-09-05 ENCOUNTER — APPOINTMENT (OUTPATIENT)
Dept: GASTROENTEROLOGY | Facility: CLINIC | Age: 63
End: 2023-09-05

## 2023-11-03 ENCOUNTER — APPOINTMENT (OUTPATIENT)
Dept: INTERNAL MEDICINE | Facility: CLINIC | Age: 63
End: 2023-11-03

## 2024-01-13 NOTE — ED STATDOCS - CPE ED NEURO NORM
RT Protocol Note  Loretta Partida 59 y.o. female MRN: [id number]  Unit/Bed#: -01 Encounter: [id number]    Assessment    Principal Problem:    Acute respiratory failure with hypoxia (HCC)  Active Problems:    COVID-19    COPD exacerbation (HCC)    Multiple sclerosis (HCC)    Thoracic outlet syndrome    Hyponatremia    Severe protein-calorie malnutrition (HCC)    Positive blood culture    Hypomagnesemia      Home Pulmonary Medications:     01/12/24 2022   Respiratory Protocol   Protocol Initiated? No   Protocol Selection Respiratory   Language Barrier? No   Medical & Social History Reviewed? Yes   Diagnostic Studies Reviewed? Yes   Physical Assessment Performed? Yes   Respiratory Plan Mild Distress pathway   Respiratory Assessment   Assessment Type During-treatment   General Appearance Alert;Awake   Respiratory Pattern Normal   Chest Assessment Chest expansion symmetrical   Bilateral Breath Sounds Diminished   Cough None   Resp Comments Will continue with current tx plan   O2 Device NC   Additional Assessments   Pulse 88   Respirations 18   SpO2 95 %            Past Medical History:   Diagnosis Date    COPD (chronic obstructive pulmonary disease) (HCC) 1/5/2024    Multiple sclerosis (HCC) 1/5/2024    Osteoporosis     Thoracic outlet syndrome 1/5/2024     Social History     Socioeconomic History    Marital status: /Civil Union     Spouse name: None    Number of children: None    Years of education: None    Highest education level: None   Occupational History    None   Tobacco Use    Smoking status: Every Day     Current packs/day: 0.50     Average packs/day: 0.5 packs/day for 30.0 years (15.0 ttl pk-yrs)     Types: Cigarettes     Start date: 1/5/1994    Smokeless tobacco: Never   Substance and Sexual Activity    Alcohol use: Not Currently    Drug use: Never    Sexual activity: None   Other Topics Concern    None   Social History Narrative    None     Social Determinants of Health     Financial Resource Strain:  "Not on file   Food Insecurity: No Food Insecurity (1/9/2024)    Hunger Vital Sign     Worried About Running Out of Food in the Last Year: Never true     Ran Out of Food in the Last Year: Never true   Transportation Needs: No Transportation Needs (1/9/2024)    PRAPARE - Transportation     Lack of Transportation (Medical): No     Lack of Transportation (Non-Medical): No   Physical Activity: Not on file   Stress: Not on file   Social Connections: Not on file   Intimate Partner Violence: Not on file   Housing Stability: Low Risk  (1/9/2024)    Housing Stability Vital Sign     Unable to Pay for Housing in the Last Year: No     Number of Places Lived in the Last Year: 1     Unstable Housing in the Last Year: No       Subjective         Objective    Physical Exam:   Assessment Type: During-treatment  General Appearance: Alert, Awake  Respiratory Pattern: Normal  Chest Assessment: Chest expansion symmetrical  Bilateral Breath Sounds: Diminished  Cough: None  O2 Device: NC    Vitals:  Blood pressure 132/72, pulse 88, temperature 97.9 °F (36.6 °C), resp. rate 18, height 5' 3\" (1.6 m), weight 34.5 kg (76 lb), SpO2 95%.    Results from last 7 days   Lab Units 01/11/24  0757   PH ART  7.422   PCO2 ART mm Hg 66.3*   PO2 ART mm Hg 55.7*   HCO3 ART mmol/L 42.2*   BASE EXC ART mmol/L 14.6   O2 CONTENT ART mL/dL 17.2   O2 HGB, ARTERIAL % 89.0*   SERENE TEST  Yes       Imaging and other studies: I have personally reviewed pertinent reports.      O2 Device: NC     Plan    Respiratory Plan: Mild Distress pathway        Resp Comments: Will continue with current tx plan   " normal...

## 2024-05-21 ENCOUNTER — EMERGENCY (EMERGENCY)
Facility: HOSPITAL | Age: 64
LOS: 1 days | Discharge: LEFT BEFORE DISCHARGE | End: 2024-05-21
Attending: EMERGENCY MEDICINE
Payer: COMMERCIAL

## 2024-05-21 VITALS
HEART RATE: 87 BPM | OXYGEN SATURATION: 97 % | TEMPERATURE: 99 F | DIASTOLIC BLOOD PRESSURE: 84 MMHG | RESPIRATION RATE: 18 BRPM | SYSTOLIC BLOOD PRESSURE: 164 MMHG

## 2024-05-21 VITALS
SYSTOLIC BLOOD PRESSURE: 184 MMHG | RESPIRATION RATE: 16 BRPM | WEIGHT: 141.1 LBS | TEMPERATURE: 99 F | OXYGEN SATURATION: 97 % | HEIGHT: 66 IN | DIASTOLIC BLOOD PRESSURE: 82 MMHG | HEART RATE: 86 BPM

## 2024-05-21 LAB
ALBUMIN SERPL ELPH-MCNC: 4.2 G/DL — SIGNIFICANT CHANGE UP (ref 3.3–5.2)
ALP SERPL-CCNC: 59 U/L — SIGNIFICANT CHANGE UP (ref 40–120)
ALT FLD-CCNC: 36 U/L — SIGNIFICANT CHANGE UP
ANION GAP SERPL CALC-SCNC: 15 MMOL/L — SIGNIFICANT CHANGE UP (ref 5–17)
AST SERPL-CCNC: 55 U/L — HIGH
BASOPHILS # BLD AUTO: 0.02 K/UL — SIGNIFICANT CHANGE UP (ref 0–0.2)
BASOPHILS NFR BLD AUTO: 0.2 % — SIGNIFICANT CHANGE UP (ref 0–2)
BILIRUB SERPL-MCNC: 0.3 MG/DL — LOW (ref 0.4–2)
BUN SERPL-MCNC: 12.7 MG/DL — SIGNIFICANT CHANGE UP (ref 8–20)
CALCIUM SERPL-MCNC: 9.6 MG/DL — SIGNIFICANT CHANGE UP (ref 8.4–10.5)
CHLORIDE SERPL-SCNC: 99 MMOL/L — SIGNIFICANT CHANGE UP (ref 96–108)
CO2 SERPL-SCNC: 24 MMOL/L — SIGNIFICANT CHANGE UP (ref 22–29)
CREAT SERPL-MCNC: 1 MG/DL — SIGNIFICANT CHANGE UP (ref 0.5–1.3)
EGFR: 84 ML/MIN/1.73M2 — SIGNIFICANT CHANGE UP
EOSINOPHIL # BLD AUTO: 0.03 K/UL — SIGNIFICANT CHANGE UP (ref 0–0.5)
EOSINOPHIL NFR BLD AUTO: 0.3 % — SIGNIFICANT CHANGE UP (ref 0–6)
GLUCOSE SERPL-MCNC: 81 MG/DL — SIGNIFICANT CHANGE UP (ref 70–99)
HCT VFR BLD CALC: 43.7 % — SIGNIFICANT CHANGE UP (ref 39–50)
HGB BLD-MCNC: 14 G/DL — SIGNIFICANT CHANGE UP (ref 13–17)
IMM GRANULOCYTES NFR BLD AUTO: 0.3 % — SIGNIFICANT CHANGE UP (ref 0–0.9)
LYMPHOCYTES # BLD AUTO: 3.53 K/UL — HIGH (ref 1–3.3)
LYMPHOCYTES # BLD AUTO: 33.4 % — SIGNIFICANT CHANGE UP (ref 13–44)
MCHC RBC-ENTMCNC: 30.9 PG — SIGNIFICANT CHANGE UP (ref 27–34)
MCHC RBC-ENTMCNC: 32 GM/DL — SIGNIFICANT CHANGE UP (ref 32–36)
MCV RBC AUTO: 96.5 FL — SIGNIFICANT CHANGE UP (ref 80–100)
MONOCYTES # BLD AUTO: 1.41 K/UL — HIGH (ref 0–0.9)
MONOCYTES NFR BLD AUTO: 13.3 % — SIGNIFICANT CHANGE UP (ref 2–14)
NEUTROPHILS # BLD AUTO: 5.55 K/UL — SIGNIFICANT CHANGE UP (ref 1.8–7.4)
NEUTROPHILS NFR BLD AUTO: 52.5 % — SIGNIFICANT CHANGE UP (ref 43–77)
PLATELET # BLD AUTO: 360 K/UL — SIGNIFICANT CHANGE UP (ref 150–400)
POTASSIUM SERPL-MCNC: 4.4 MMOL/L — SIGNIFICANT CHANGE UP (ref 3.5–5.3)
POTASSIUM SERPL-SCNC: 4.4 MMOL/L — SIGNIFICANT CHANGE UP (ref 3.5–5.3)
PROT SERPL-MCNC: 7.6 G/DL — SIGNIFICANT CHANGE UP (ref 6.6–8.7)
RBC # BLD: 4.53 M/UL — SIGNIFICANT CHANGE UP (ref 4.2–5.8)
RBC # FLD: 12.9 % — SIGNIFICANT CHANGE UP (ref 10.3–14.5)
SODIUM SERPL-SCNC: 138 MMOL/L — SIGNIFICANT CHANGE UP (ref 135–145)
TROPONIN T, HIGH SENSITIVITY RESULT: 20 NG/L — SIGNIFICANT CHANGE UP (ref 0–51)
WBC # BLD: 10.57 K/UL — HIGH (ref 3.8–10.5)
WBC # FLD AUTO: 10.57 K/UL — HIGH (ref 3.8–10.5)

## 2024-05-21 PROCEDURE — 36415 COLL VENOUS BLD VENIPUNCTURE: CPT

## 2024-05-21 PROCEDURE — 93010 ELECTROCARDIOGRAM REPORT: CPT

## 2024-05-21 PROCEDURE — 71045 X-RAY EXAM CHEST 1 VIEW: CPT | Mod: 26

## 2024-05-21 PROCEDURE — 93005 ELECTROCARDIOGRAM TRACING: CPT

## 2024-05-21 PROCEDURE — 94640 AIRWAY INHALATION TREATMENT: CPT

## 2024-05-21 PROCEDURE — 99285 EMERGENCY DEPT VISIT HI MDM: CPT | Mod: 25

## 2024-05-21 PROCEDURE — 85025 COMPLETE CBC W/AUTO DIFF WBC: CPT

## 2024-05-21 PROCEDURE — 80053 COMPREHEN METABOLIC PANEL: CPT

## 2024-05-21 PROCEDURE — 36000 PLACE NEEDLE IN VEIN: CPT

## 2024-05-21 PROCEDURE — 84484 ASSAY OF TROPONIN QUANT: CPT

## 2024-05-21 PROCEDURE — 71045 X-RAY EXAM CHEST 1 VIEW: CPT

## 2024-05-21 RX ORDER — IPRATROPIUM/ALBUTEROL SULFATE 18-103MCG
3 AEROSOL WITH ADAPTER (GRAM) INHALATION ONCE
Refills: 0 | Status: COMPLETED | OUTPATIENT
Start: 2024-05-21 | End: 2024-05-21

## 2024-05-21 RX ORDER — IBUPROFEN 200 MG
800 TABLET ORAL ONCE
Refills: 0 | Status: COMPLETED | OUTPATIENT
Start: 2024-05-21 | End: 2024-05-21

## 2024-05-21 RX ORDER — KETOROLAC TROMETHAMINE 30 MG/ML
15 SYRINGE (ML) INJECTION ONCE
Refills: 0 | Status: DISCONTINUED | OUTPATIENT
Start: 2024-05-21 | End: 2024-05-21

## 2024-05-21 RX ADMIN — Medication 3 MILLILITER(S): at 08:12

## 2024-05-21 RX ADMIN — Medication 800 MILLIGRAM(S): at 08:33

## 2024-05-21 NOTE — ED PROVIDER NOTE - CLINICAL SUMMARY MEDICAL DECISION MAKING FREE TEXT BOX
64-year-old male presents with sore throat, chest pain that is pleuritic in nature, mild headache after waking up this morning.  Patient states he had headache last night after being exposed to some synthetic marijuana smoke.  Vitals within normal limits, EKG nonischemic.  Chest pain is nonexertional.  Lungs are clear, but patient is an active smoker.  Will give Toradol for pain, neb treatment, check labs including troponin, chest x-ray, viral swab and reassess. 64-year-old male presents with sore throat, chest pain that is pleuritic in nature, mild headache after waking up this morning.  Patient states he had headache last night after being exposed to some synthetic marijuana smoke.  Vitals within normal limits, EKG nonischemic.  Chest pain is nonexertional.  Lungs are clear, but patient is an active smoker.  Will give Toradol for pain, neb treatment, check labs including troponin, chest x-ray, viral swab and reassess. I discussed plan with patient and daughter at bedside who agree with plan.

## 2024-05-21 NOTE — ED PROVIDER NOTE - PROGRESS NOTE DETAILS
Charmaine Cabezas, DO: Patient refusing labs, swab, IV placement and IV medication. Charmaine Cabezas, DO: Patient refusing labs, swab, IV placement and IV medication. I discussed with patient who was more comfortable with me placing the IV. Medication changed to oral. Charmaine Cabezas, DO: Patient refusing to go into his room since another patient was placed in there. I shared results of borderline troponin and need for a second. He states he feels better. Agreeable to 2nd troponin, but wants IV out and wants to go sit in the waiting room. I explained that he cannot sit in the waiting room while an active patient, that he should be monitored as he is having chest pain. He verbalizes understanding. RN approached me and stated again that he would like to go sit in the waiting room. Patient again informed he cannot do this as an active patient. Pending 2nd troponin result at this time. Charmaine Cabezas DO: I was notified by RN that patient eloped.

## 2024-05-21 NOTE — ED PROVIDER NOTE - OBJECTIVE STATEMENT
64-year-old male with past medical history of hypertension (on losartan) presents complaining of waking up with a mild headache, sore throat and shortness of breath that is sharp, nonradiating, nonexertional and worse with deep inspiration.  He states he feels as though he cannot get a good breath in.  He states has never had anything like this before.  He felt well all day until last night when he states he was at the bus stop and people were smoking K2," sometimes when people are smoking K2 I get a headache".  He went home and took 400 of ibuprofen and went to sleep, woke up feeling like this this morning.  He did not take his losartan last night as he did not feel well.  He denies any fevers, chills, nausea, vomiting, abdominal pain, lower extremity edema, calf pain.  He does smoke, does drink alcohol, denies illicit drugs.  He denies allergies to medications.

## 2024-05-21 NOTE — ED PROVIDER NOTE - PHYSICAL EXAMINATION
Const: Awake, alert and oriented. In no acute distress. Well appearing.  HEENT: NC/AT. Moist mucous membranes. Posterior oropharynx clear, uvula midline, no tonsillar edema or exudate.  Eyes: No scleral icterus. EOMI.  Neck:. Soft and supple. Full ROM without pain. No meningismus.  Cardiac: Regular rate and regular rhythm. +S1/S2. No murmurs. Peripheral pulses 2+ and symmetric. No LE edema.  Resp: Speaking in full sentences. No evidence of respiratory distress. No wheezes, rales or rhonchi.  Abd: Soft, non-tender, non-distended. Normal bowel sounds in all 4 quadrants. No guarding or rebound.  Back: Spine midline and non-tender. No CVAT.  Skin: No rashes, abrasions or lacerations.  Neuro: Awake, alert & oriented x 3. Moves all extremities symmetrically.

## 2024-05-21 NOTE — ED PROVIDER NOTE - NS ED ROS FT
Const: Denies fever, chills  HEENT: + sore throat. Denies blurry vision  Neck: Denies neck pain/stiffness  Resp: + SOB. Denies coughing  Cardiovascular: + CP. Denies palpitations, LE edema  GI: Denies nausea, vomiting, abdominal pain, diarrhea, constipation, blood in stool  : Denies urinary frequency/urgency/dysuria, hematuria  MSK: Denies back pain  Neuro: + HA. Denies dizziness, numbness, weakness  Skin: Denies rashes.

## 2024-05-21 NOTE — ED PROCEDURE NOTE - CPROC ED DIRECTIONAL
left Drysol Pregnancy And Lactation Text: This medication is considered safe during pregnancy and breast feeding.

## 2024-05-21 NOTE — ED ADULT NURSE NOTE - OBJECTIVE STATEMENT
Pt awake & alert, oriented x 4, presenting to the ED complaining of sore throat and SOB which started this morning. Pt states he is unable to take deep breaths. Denies NVD. Pt states he has hx of hypertension and takes Losartan daily. Pt did not take his Losartan last night. Airway patent. Respirations even and unlabored. Pt safety maintained.

## 2024-05-25 DIAGNOSIS — R07.81 PLEURODYNIA: ICD-10-CM

## 2024-05-25 DIAGNOSIS — Z53.29 PROCEDURE AND TREATMENT NOT CARRIED OUT BECAUSE OF PATIENT'S DECISION FOR OTHER REASONS: ICD-10-CM

## 2024-05-25 DIAGNOSIS — Z79.899 OTHER LONG TERM (CURRENT) DRUG THERAPY: ICD-10-CM

## 2024-05-25 DIAGNOSIS — I10 ESSENTIAL (PRIMARY) HYPERTENSION: ICD-10-CM

## 2024-05-25 DIAGNOSIS — R51.9 HEADACHE, UNSPECIFIED: ICD-10-CM

## 2024-05-25 DIAGNOSIS — J02.9 ACUTE PHARYNGITIS, UNSPECIFIED: ICD-10-CM

## 2024-08-08 ENCOUNTER — NON-APPOINTMENT (OUTPATIENT)
Age: 64
End: 2024-08-08

## 2024-08-20 ENCOUNTER — APPOINTMENT (OUTPATIENT)
Dept: ORTHOPEDIC SURGERY | Facility: CLINIC | Age: 64
End: 2024-08-20
Payer: MEDICAID

## 2024-08-20 VITALS
WEIGHT: 140 LBS | DIASTOLIC BLOOD PRESSURE: 94 MMHG | HEART RATE: 71 BPM | SYSTOLIC BLOOD PRESSURE: 139 MMHG | BODY MASS INDEX: 22.5 KG/M2 | HEIGHT: 66 IN

## 2024-08-20 DIAGNOSIS — M47.816 SPONDYLOSIS W/OUT MYELOPATHY OR RADICULOPATHY, LUMBAR REGION: ICD-10-CM

## 2024-08-20 PROCEDURE — 72100 X-RAY EXAM L-S SPINE 2/3 VWS: CPT

## 2024-08-20 PROCEDURE — 99204 OFFICE O/P NEW MOD 45 MIN: CPT | Mod: 25

## 2024-08-20 NOTE — PHYSICAL EXAM
[Antalgic] : antalgic [de-identified] : CONSTITUTIONAL: The patient is a very pleasant individual who is well-nourished and who appears stated age. PSYCHIATRIC: The patient is alert and oriented X 3 and in no apparent distress, and participates with orthopedic evaluation well. HEAD: Atraumatic and is nonsyndromic in appearance. EENT: No visible thyromegaly, EOMI. RESPIRATORY: Respiratory rate is regular, not dyspneic on examination. LYMPHATICS: There is no inguinal lymphadenopathy INTEGUMENTARY: Skin is clean, dry, and intact about the bilateral lower extremities and lumbar spine. VASCULAR: There is brisk capillary refill about the bilateral lower extremities. NEUROLOGIC: There are no pathologic reflexes. There is no decrease in sensation of the bilateral lower extremities on manual  examination. Deep tendon reflexes are well maintained at 2+/4 of the bilateral lower extremities and are symmetric.. MUSCULOSKELETAL: There is no visible muscular atrophy. Manual motor strength is well maintained in the bilateral lower extremities. Range of motion of lumbar spine is well maintained. The patient ambulates in a non-myelopathic manner. Negative tension sign and straight leg raise bilaterally. Quad extension, ankle dorsiflexion, EHL, plantar flexion, and ankle eversion are well preserved. Normal secondary orthopaedic exam of bilateral hips, greater trochanteric area, knees and ankles, mild and well-maintained mechanically orientated low back pain [de-identified] : X-rays of the lumbar spine been reviewed on today's date shows well-maintained lordosis to spaces are maintained mild loss of disc height at L4-L5.  2 views lumbar spine August 20, 2024

## 2024-08-20 NOTE — DISCUSSION/SUMMARY
[de-identified] : Patient was in the urgent care earlier in August found to have chest pain the urgent care staff decided to send him to the emergency department patient never went's the patient had a history of chest pain within the month of August 2024 with no clinical follow-up I do not feel comfortable setting this patient into physical therapy with an unknown cardiac history based upon history of chest pain also not a candidate for anti-inflammatories until cardiac is fully evaluated patient's been referred back to his primary care physician for cardiac workup once this is clarified rectified and he is deemed stable from a cardiac perspective physical therapy anti-inflammatories can be initiated.  Also stressed smoking cessation as this is a risk factor for cardiac events and also the amount of he is still drinking on a daily basis in the chart it is noted greater than 6 beers per day.  This also be needs to be undergoing cessation or alcohol abatement.

## 2024-08-20 NOTE — HISTORY OF PRESENT ILLNESS
[de-identified] : Very pleasant 64-year-old gentleman presents for workup of low back pain.  Upon review of the chart I think most prominent he was just in an urgent care and then sent to the emergency department for workup for complaint of chest pain he is not followed up with the emergency department not followed up with his primary care with regard to this complaint.  I have urged him because of his complaint back on August 4 of chest pain to follow-up with his PCP and presented emergency department ASAP for further cardiac workup.  With regard to his back pain he states he has had chronic mild underlying low back pain does have a history of smoking and alcoholism. [Worsening] : worsening [___ yrs] : [unfilled] year(s) ago [2] : an average pain level of 2/10 [0] : a minimum pain level of 0/10 [10] : a maximum pain level of 10/10 [Constant] : ~He/She~ states the symptoms seem to be constant [Bending] : worsened by bending [Lifting] : worsened by lifting [Rest] : relieved by rest [Ataxia] : no ataxia [Incontinence] : no incontinence [Loss of Dexterity] : good dexterity

## 2024-08-29 ENCOUNTER — NON-APPOINTMENT (OUTPATIENT)
Age: 64
End: 2024-08-29

## 2024-08-29 ENCOUNTER — APPOINTMENT (OUTPATIENT)
Dept: CARDIOLOGY | Facility: CLINIC | Age: 64
End: 2024-08-29
Payer: MEDICAID

## 2024-08-29 VITALS
HEIGHT: 66 IN | WEIGHT: 132 LBS | DIASTOLIC BLOOD PRESSURE: 96 MMHG | OXYGEN SATURATION: 97 % | SYSTOLIC BLOOD PRESSURE: 152 MMHG | HEART RATE: 63 BPM | BODY MASS INDEX: 21.21 KG/M2

## 2024-08-29 DIAGNOSIS — F17.200 NICOTINE DEPENDENCE, UNSPECIFIED, UNCOMPLICATED: ICD-10-CM

## 2024-08-29 DIAGNOSIS — R06.02 SHORTNESS OF BREATH: ICD-10-CM

## 2024-08-29 DIAGNOSIS — I10 ESSENTIAL (PRIMARY) HYPERTENSION: ICD-10-CM

## 2024-08-29 DIAGNOSIS — F10.10 ALCOHOL ABUSE, UNCOMPLICATED: ICD-10-CM

## 2024-08-29 PROCEDURE — 99406 BEHAV CHNG SMOKING 3-10 MIN: CPT

## 2024-08-29 PROCEDURE — G2211 COMPLEX E/M VISIT ADD ON: CPT | Mod: NC

## 2024-08-29 PROCEDURE — 93000 ELECTROCARDIOGRAM COMPLETE: CPT

## 2024-08-29 PROCEDURE — 99205 OFFICE O/P NEW HI 60 MIN: CPT | Mod: 25

## 2024-08-29 NOTE — DISCUSSION/SUMMARY
[FreeTextEntry1] : Mr. QING HOOD is a very pleasant 64 year man.  #tobacco abuse #shortness of breath #Chest pressure #HTN #ETOH abuse  -check nuclear stress test -check FLP, A1c -RTC 3 mo -discussed abstaining from cigarettes, not interested in patches. Will give up cold turkey. [EKG obtained to assist in diagnosis and management of assessed problem(s)] : EKG obtained to assist in diagnosis and management of assessed problem(s)

## 2024-08-29 NOTE — HISTORY OF PRESENT ILLNESS
[FreeTextEntry1] : Mr. QING HOOD is a very pleasant 64 year man with a past medical history of tobacco abuse, etoh abuse (12 pack beer/day), presenting for evaluation. A week ago he had shortness of breath and chest pressure that was sudden. Presented to urgent care. He was not having an MI so was told to f/u with cards.   Today he feels well and has no complaints but that episode scared him. Smokes 1 pack every 2-3 days.   Otherwise, denies orthopnea, paroxysmal nocturnal dyspnea, lower extremity edema, unexplained weight gain or dyspnea on exertion.  ECG sinus arrhythmia.

## 2024-08-29 NOTE — COUNSELING
[Yes] : Risk of tobacco use and health benefits of smoking cessation discussed: Yes [Cessation strategies including cessation program discussed] : Cessation strategies including cessation program discussed [Use of nicotine replacement therapies and other medications discussed] : Use of nicotine replacement therapies and other medications discussed

## 2024-08-31 LAB
CHOLEST SERPL-MCNC: 243 MG/DL
ESTIMATED AVERAGE GLUCOSE: 114 MG/DL
HBA1C MFR BLD HPLC: 5.6 %
HDLC SERPL-MCNC: 86 MG/DL
LDLC SERPL CALC-MCNC: 143 MG/DL
NONHDLC SERPL-MCNC: 157 MG/DL
TRIGL SERPL-MCNC: 82 MG/DL

## 2024-09-24 ENCOUNTER — APPOINTMENT (OUTPATIENT)
Dept: CARDIOLOGY | Facility: CLINIC | Age: 64
End: 2024-09-24

## 2024-10-03 ENCOUNTER — APPOINTMENT (OUTPATIENT)
Dept: ORTHOPEDIC SURGERY | Facility: CLINIC | Age: 64
End: 2024-10-03
Payer: MEDICAID

## 2024-10-03 VITALS — WEIGHT: 135 LBS | BODY MASS INDEX: 21.69 KG/M2 | HEIGHT: 66 IN

## 2024-10-03 DIAGNOSIS — F17.200 NICOTINE DEPENDENCE, UNSPECIFIED, UNCOMPLICATED: ICD-10-CM

## 2024-10-03 DIAGNOSIS — F10.10 ALCOHOL ABUSE, UNCOMPLICATED: ICD-10-CM

## 2024-10-03 DIAGNOSIS — M47.816 SPONDYLOSIS W/OUT MYELOPATHY OR RADICULOPATHY, LUMBAR REGION: ICD-10-CM

## 2024-10-03 PROCEDURE — 99213 OFFICE O/P EST LOW 20 MIN: CPT

## 2024-10-04 NOTE — HISTORY OF PRESENT ILLNESS
[de-identified] : 64-year-old male presents today for follow-up evaluation of lower back pain.  The patient reports he continues to have dull aching back pain that is worse with increased activity.  He has difficulties with forward flexion, lifting, bending.  He denies any neurogenic claudication type symptoms.  No radiation of pain down bilateral lower extremities.  No numbness tingling weakness in bilateral lower extremities.  No bowel / bladder incontinence. No saddle anesthesia.  The patient states that he has had previous back pain that has resolved/been improved with conservative treatment.  However we have been waiting to obtain clearance via cardiology in order to start physical therapy/modalities.  The patient is pending a stress test which was postponed secondary to inability to walk on treadmill secondary to back pain.  He is pending a pharmacological stress test which she has not set up yet.  Currently denying any chest pain shortness of breath.  Patient was redirected to cardiology to complete testing so that we may start conservative treatment.

## 2024-10-04 NOTE — HISTORY OF PRESENT ILLNESS
[de-identified] : 64-year-old male presents today for follow-up evaluation of lower back pain.  The patient reports he continues to have dull aching back pain that is worse with increased activity.  He has difficulties with forward flexion, lifting, bending.  He denies any neurogenic claudication type symptoms.  No radiation of pain down bilateral lower extremities.  No numbness tingling weakness in bilateral lower extremities.  No bowel / bladder incontinence. No saddle anesthesia.  The patient states that he has had previous back pain that has resolved/been improved with conservative treatment.  However we have been waiting to obtain clearance via cardiology in order to start physical therapy/modalities.  The patient is pending a stress test which was postponed secondary to inability to walk on treadmill secondary to back pain.  He is pending a pharmacological stress test which she has not set up yet.  Currently denying any chest pain shortness of breath.  Patient was redirected to cardiology to complete testing so that we may start conservative treatment.

## 2024-10-04 NOTE — DISCUSSION/SUMMARY
[Medication Risks Reviewed] : Medication risks reviewed [de-identified] : 64-year-old male presenting for follow-up of lumbar spondylosis.  Unfortunately at this time the patient is still not completed cardiac workup and is pending a pharmacological nuclear stress test.  At this point we cannot proceed with physical therapy/home exercising until cleared by cardiology as the patient states he can even walk on a treadmill without significant pain.  Do believe that this patient would do well with conservative treatment, but we will wait for him to complete cardiac/medical workup.  In the interim continue with smoking cessation and alcohol cessation.  He can take medications as per his PCP and cardiologist.  Topical modalities heat, ice, lidocaine patches etc. can also be helpful during this time.  The patient will follow-up after cardiac workup is completed so that we may start conservative treatment for his lumbar spondylosis. All questions and concerns were addressed with the patient and they are in agreement with this plan.   This note was generated using dragon medical dictation software. A reasonable effort has been made for proofreading its contents, but typos may still remain. If there are any questions or points of clarification needed please notify my office.

## 2024-10-04 NOTE — DISCUSSION/SUMMARY
[Medication Risks Reviewed] : Medication risks reviewed [de-identified] : 64-year-old male presenting for follow-up of lumbar spondylosis.  Unfortunately at this time the patient is still not completed cardiac workup and is pending a pharmacological nuclear stress test.  At this point we cannot proceed with physical therapy/home exercising until cleared by cardiology as the patient states he can even walk on a treadmill without significant pain.  Do believe that this patient would do well with conservative treatment, but we will wait for him to complete cardiac/medical workup.  In the interim continue with smoking cessation and alcohol cessation.  He can take medications as per his PCP and cardiologist.  Topical modalities heat, ice, lidocaine patches etc. can also be helpful during this time.  The patient will follow-up after cardiac workup is completed so that we may start conservative treatment for his lumbar spondylosis. All questions and concerns were addressed with the patient and they are in agreement with this plan.   This note was generated using dragon medical dictation software. A reasonable effort has been made for proofreading its contents, but typos may still remain. If there are any questions or points of clarification needed please notify my office.

## 2024-10-04 NOTE — PHYSICAL EXAM
[Antalgic] : antalgic [de-identified] : CONSTITUTIONAL: The patient is a very pleasant individual who is well-nourished and who appears stated age. PSYCHIATRIC: The patient is alert and oriented X 3 and in no apparent distress, and participates with orthopedic evaluation well. HEAD: Atraumatic and is nonsyndromic in appearance. EENT: No visible thyromegaly, EOMI. RESPIRATORY: Respiratory rate is regular, not dyspneic on examination. LYMPHATICS: There is no inguinal lymphadenopathy INTEGUMENTARY: Skin is clean, dry, and intact about the bilateral lower extremities and lumbar spine. VASCULAR: There is brisk capillary refill about the bilateral lower extremities. NEUROLOGIC: There are no pathologic reflexes. There is no decrease in sensation of the bilateral lower extremities on manual  examination. Deep tendon reflexes are well maintained at 2+/4 of the bilateral lower extremities and are symmetric.. MUSCULOSKELETAL: There is no visible muscular atrophy. Manual motor strength is well maintained in the bilateral lower extremities. Range of motion of lumbar spine is well maintained. The patient ambulates in a non-myelopathic manner. Negative tension sign and straight leg raise bilaterally. Quad extension, ankle dorsiflexion, EHL, plantar flexion, and ankle eversion are well preserved. Normal secondary orthopaedic exam of bilateral hips, greater trochanteric area, knees and ankles, mild and well-maintained mechanically orientated low back pain [de-identified] : X-rays of the lumbar spine been reviewed shows well-maintained lordosis to spaces are maintained mild loss of disc height at L4-L5.  2 views lumbar spine August 20, 2024

## 2024-10-04 NOTE — PHYSICAL EXAM
[Antalgic] : antalgic [de-identified] : CONSTITUTIONAL: The patient is a very pleasant individual who is well-nourished and who appears stated age. PSYCHIATRIC: The patient is alert and oriented X 3 and in no apparent distress, and participates with orthopedic evaluation well. HEAD: Atraumatic and is nonsyndromic in appearance. EENT: No visible thyromegaly, EOMI. RESPIRATORY: Respiratory rate is regular, not dyspneic on examination. LYMPHATICS: There is no inguinal lymphadenopathy INTEGUMENTARY: Skin is clean, dry, and intact about the bilateral lower extremities and lumbar spine. VASCULAR: There is brisk capillary refill about the bilateral lower extremities. NEUROLOGIC: There are no pathologic reflexes. There is no decrease in sensation of the bilateral lower extremities on manual  examination. Deep tendon reflexes are well maintained at 2+/4 of the bilateral lower extremities and are symmetric.. MUSCULOSKELETAL: There is no visible muscular atrophy. Manual motor strength is well maintained in the bilateral lower extremities. Range of motion of lumbar spine is well maintained. The patient ambulates in a non-myelopathic manner. Negative tension sign and straight leg raise bilaterally. Quad extension, ankle dorsiflexion, EHL, plantar flexion, and ankle eversion are well preserved. Normal secondary orthopaedic exam of bilateral hips, greater trochanteric area, knees and ankles, mild and well-maintained mechanically orientated low back pain [de-identified] : X-rays of the lumbar spine been reviewed shows well-maintained lordosis to spaces are maintained mild loss of disc height at L4-L5.  2 views lumbar spine August 20, 2024

## 2024-10-10 ENCOUNTER — APPOINTMENT (OUTPATIENT)
Dept: CARDIOLOGY | Facility: CLINIC | Age: 64
End: 2024-10-10

## 2024-11-25 ENCOUNTER — APPOINTMENT (OUTPATIENT)
Dept: CARDIOLOGY | Facility: CLINIC | Age: 64
End: 2024-11-25

## 2025-01-21 ENCOUNTER — APPOINTMENT (OUTPATIENT)
Dept: CARDIOLOGY | Facility: CLINIC | Age: 65
End: 2025-01-21